# Patient Record
Sex: MALE | Race: ASIAN | Employment: FULL TIME | URBAN - METROPOLITAN AREA
[De-identification: names, ages, dates, MRNs, and addresses within clinical notes are randomized per-mention and may not be internally consistent; named-entity substitution may affect disease eponyms.]

---

## 2019-11-18 ENCOUNTER — OFFICE VISIT (OUTPATIENT)
Dept: FAMILY MEDICINE | Facility: CLINIC | Age: 34
End: 2019-11-18
Payer: COMMERCIAL

## 2019-11-18 VITALS
WEIGHT: 155 LBS | DIASTOLIC BLOOD PRESSURE: 62 MMHG | HEART RATE: 60 BPM | HEIGHT: 69 IN | SYSTOLIC BLOOD PRESSURE: 120 MMHG | BODY MASS INDEX: 22.96 KG/M2 | TEMPERATURE: 98.1 F

## 2019-11-18 DIAGNOSIS — K13.79 MOUTH SORES: ICD-10-CM

## 2019-11-18 DIAGNOSIS — B00.9 HERPES SIMPLEX VIRUS INFECTION: Primary | ICD-10-CM

## 2019-11-18 DIAGNOSIS — N48.89 SORE OF PENIS: ICD-10-CM

## 2019-11-18 PROCEDURE — 36415 COLL VENOUS BLD VENIPUNCTURE: CPT | Performed by: NURSE PRACTITIONER

## 2019-11-18 PROCEDURE — 86780 TREPONEMA PALLIDUM: CPT | Performed by: NURSE PRACTITIONER

## 2019-11-18 PROCEDURE — 99203 OFFICE O/P NEW LOW 30 MIN: CPT | Performed by: NURSE PRACTITIONER

## 2019-11-18 PROCEDURE — 87389 HIV-1 AG W/HIV-1&-2 AB AG IA: CPT | Performed by: NURSE PRACTITIONER

## 2019-11-18 PROCEDURE — 87529 HSV DNA AMP PROBE: CPT | Performed by: NURSE PRACTITIONER

## 2019-11-18 RX ORDER — LIDOCAINE HYDROCHLORIDE 20 MG/ML
15 SOLUTION OROPHARYNGEAL
Qty: 100 ML | Refills: 1 | Status: SHIPPED | OUTPATIENT
Start: 2019-11-18

## 2019-11-18 RX ORDER — VALACYCLOVIR HYDROCHLORIDE 1 G/1
1000 TABLET, FILM COATED ORAL 2 TIMES DAILY
Qty: 20 TABLET | Refills: 0 | Status: SHIPPED | OUTPATIENT
Start: 2019-11-18 | End: 2019-11-28

## 2019-11-18 ASSESSMENT — MIFFLIN-ST. JEOR: SCORE: 1628.46

## 2019-11-18 NOTE — PATIENT INSTRUCTIONS
I'll follow up with lab results.    Let me know if you don't note improvement in the coming week or so.

## 2019-11-18 NOTE — PROGRESS NOTES
"Subjective     Esteban Aldana is a 34 year old male who presents to clinic today for the following health issues:      Concern - Last Thursday, Pt woke up and saw there were three lesions on the roof of his mouth, was seen by a NP at his work who did not prescribe anything. Next day there were many, large in size, drained and painful. There is a spot on his left hand, on eyelid, and on penis     HPI: Esteban presents today with the complaint of painful blisters that have developed within his mouth, on his penis, and on his anus.     Tuesday - he went to the dentist for routine cleaning.   Wednesday - had diarrhea (8-9 times).   Thursday - a few water-filled blisters developed on the roof of his mouth. Was seen by office provider at work. Watchful waiting recommended.  These sores have persisted / worsened and have extended to the base of his penis and over his anus.     No fever, chills, body aches.     No known STI exposure.     There is no problem list on file for this patient.    History reviewed. No pertinent surgical history.    Social History     Tobacco Use     Smoking status: Former Smoker     Smokeless tobacco: Never Used   Substance Use Topics     Alcohol use: Not Currently     History reviewed. No pertinent family history.        Reviewed and updated as needed this visit by Provider  Tobacco  Allergies  Meds  Problems  Med Hx  Surg Hx  Fam Hx         Review of Systems   ROS COMP: Constitutional, HEENT, GI, , neuro, skin systems are negative, except as otherwise noted.      Objective    /62 (BP Location: Left arm, Patient Position: Chair, Cuff Size: Adult Regular)   Pulse 60   Temp 98.1  F (36.7  C) (Tympanic)   Ht 1.753 m (5' 9\")   Wt 70.3 kg (155 lb)   BMI 22.89 kg/m    Body mass index is 22.89 kg/m .  Physical Exam   GENERAL: healthy, alert and no distress  HENT: ear canals and TM's normal, nose without ulcers or lesions; Roof of mouth with extensive erupted vesicles / ulcers. Small " clusters of white-headed vesicles on reddened bases along buccal surfaces. No bleeding.    (male): normal male genitalia. 1 cm x 1 cm shallow ulcer over base of penis (no bleeding or obvious drainage).  RECTAL (male): Ulcer similar in character and size to that on penis at 12 o'clock on skin of anus  NEURO: Normal strength and tone, mentation intact and speech normal    Diagnostic Test Results:  No results found for this or any previous visit (from the past 24 hour(s)).        Assessment & Plan     Esteban was seen today for mouth problem. Unclear what is driving this. Vesicles on roof of mouth do look herpetic in nature, so will treat with antiviral regimen (Valtrex for 10 days). It is mysterious why all of these sites experienced eruption simultaneously. Specimens collected from penis and oral cavity for HSV PCR study. Diarrhea was seemingly only other non skin-related complaint (ie no systemic features). Will also check HIV and syphilis though he states he doesn't believe himself to be at risk (though penile ulcer does resemble potential chancre). Will order viscous lidocaine for oral anesthesia in the meantime. Will follow up as I get these lab results. He will follow up with me to update me on progress of his symptoms / recovery. Discussed reasons to call or return to clinic. Esteban acknowledges and demonstrates understanding of circumstances under which care should be sought urgently or emergently. Follow up as discussed. Discussed risks, benefits, alternatives, potential side effects, and proper administration of new medication / treatment. Agrees with plan of care. All questions answered.     Addendum: Behcet's disease must be considered if he fails to respond to antiviral therapy or if these symptoms recur after resolution.     Diagnoses and all orders for this visit:    Herpes simplex virus infection  -     valACYclovir (VALTREX) 1000 mg tablet; Take 1 tablet (1,000 mg) by mouth 2 times daily for 10  days    Mouth sores  -     HSV 1 and 2 DNA by PCR  -     lidocaine (XYLOCAINE) 2 % solution; Swish and spit 15 mLs in mouth every 3 hours as needed for moderate pain ; Max 8 doses/24 hour period.  -     Treponema Abs w Reflex to RPR and Titer  -     HIV Antigen Antibody Combo    Sore of penis  -     HSV 1 and 2 DNA by PCR  -     Treponema Abs w Reflex to RPR and Titer  -     HIV Antigen Antibody Combo        See Patient Instructions    Return in about 1 week (around 11/25/2019) for persistent or worsening symptoms.    Warren Gutierrez, TORI  [facility] [place]

## 2019-11-20 ENCOUNTER — MYC MEDICAL ADVICE (OUTPATIENT)
Dept: FAMILY MEDICINE | Facility: CLINIC | Age: 34
End: 2019-11-20

## 2019-11-20 LAB
HIV 1+2 AB+HIV1 P24 AG SERPL QL IA: NONREACTIVE
HSV1 DNA SPEC QL NAA+PROBE: NEGATIVE
HSV2 DNA SPEC QL NAA+PROBE: NEGATIVE
SPECIMEN SOURCE: NORMAL
T PALLIDUM AB SER QL: NONREACTIVE

## 2019-11-20 NOTE — TELEPHONE ENCOUNTER
Please see [employer] message and advise. Thank you very much.    Maribell Vang RN, BSN  [facility] [place]

## 2019-11-21 ENCOUNTER — MYC MEDICAL ADVICE (OUTPATIENT)
Dept: FAMILY MEDICINE | Facility: CLINIC | Age: 34
End: 2019-11-21

## 2019-11-21 LAB
HSV1 DNA SPEC QL NAA+PROBE: NEGATIVE
HSV2 DNA SPEC QL NAA+PROBE: NEGATIVE
SPECIMEN SOURCE: NORMAL

## 2020-03-11 ENCOUNTER — HEALTH MAINTENANCE LETTER (OUTPATIENT)
Age: 35
End: 2020-03-11

## 2021-01-04 ENCOUNTER — HEALTH MAINTENANCE LETTER (OUTPATIENT)
Age: 36
End: 2021-01-04

## 2021-04-25 ENCOUNTER — HEALTH MAINTENANCE LETTER (OUTPATIENT)
Age: 36
End: 2021-04-25

## 2021-08-03 ENCOUNTER — MYC MEDICAL ADVICE (OUTPATIENT)
Dept: FAMILY MEDICINE | Facility: CLINIC | Age: 36
End: 2021-08-03

## 2022-05-22 ENCOUNTER — HEALTH MAINTENANCE LETTER (OUTPATIENT)
Age: 37
End: 2022-05-22

## 2022-09-18 ENCOUNTER — HEALTH MAINTENANCE LETTER (OUTPATIENT)
Age: 37
End: 2022-09-18

## 2023-06-04 ENCOUNTER — HEALTH MAINTENANCE LETTER (OUTPATIENT)
Age: 38
End: 2023-06-04